# Patient Record
Sex: FEMALE | Race: WHITE | NOT HISPANIC OR LATINO | Employment: STUDENT | URBAN - METROPOLITAN AREA
[De-identification: names, ages, dates, MRNs, and addresses within clinical notes are randomized per-mention and may not be internally consistent; named-entity substitution may affect disease eponyms.]

---
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
## 2024-02-15 ENCOUNTER — HOSPITAL ENCOUNTER (EMERGENCY)
Facility: HOSPITAL | Age: 19
Discharge: HOME | End: 2024-02-15
Attending: EMERGENCY MEDICINE
Payer: COMMERCIAL

## 2024-02-15 ENCOUNTER — APPOINTMENT (OUTPATIENT)
Dept: GENERAL RADIOLOGY | Facility: HOSPITAL | Age: 19
End: 2024-02-15
Payer: COMMERCIAL

## 2024-02-15 VITALS
DIASTOLIC BLOOD PRESSURE: 75 MMHG | RESPIRATION RATE: 17 BRPM | SYSTOLIC BLOOD PRESSURE: 99 MMHG | HEART RATE: 105 BPM | TEMPERATURE: 99 F | OXYGEN SATURATION: 100 % | WEIGHT: 108.03 LBS

## 2024-02-15 DIAGNOSIS — S16.1XXA STRAIN OF NECK MUSCLE, INITIAL ENCOUNTER: ICD-10-CM
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
## 2024-02-15 PROCEDURE — 72050 X-RAY EXAM NECK SPINE 4/5VWS: CPT

## 2024-02-15 PROCEDURE — 99283 EMERGENCY DEPT VISIT LOW MDM: CPT

## 2024-02-15 RX ORDER — IBUPROFEN 600 MG/1
600 TABLET ORAL EVERY 6 HOURS PRN
Qty: 30 TABLET | Refills: 0 | Status: SHIPPED | OUTPATIENT
Start: 2024-02-15

## 2024-02-15 RX ORDER — CYCLOBENZAPRINE HCL 10 MG
10 TABLET ORAL ONCE
Status: COMPLETED | OUTPATIENT
Start: 2024-02-15 | End: 2024-02-15

## 2024-02-15 RX ORDER — CYCLOBENZAPRINE HCL 10 MG
10 TABLET ORAL 3 TIMES DAILY PRN
Qty: 15 TABLET | Refills: 0 | Status: SHIPPED | OUTPATIENT
Start: 2024-02-15

## 2024-02-15 RX ADMIN — CYCLOBENZAPRINE 10 MG: 10 TABLET, FILM COATED ORAL at 19:31

## 2024-02-15 RX ADMIN — IBUPROFEN 600 MG: 400 TABLET ORAL at 19:31

## 2024-02-15 NOTE — Clinical Note
[facility] EMERGENCY ROOM  [address]  [place] [address]  Phone: [phone]    Gillian Boogie was seen and treated in our emergency department on 2/15/2024.  She may return to school on 02/19/2024.          Thank you for choosing [facility].    Althea Dos Santos APRN
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
## 2024-02-15 NOTE — Clinical Note
[facility] EMERGENCY ROOM  [address]  [place] [address]  Phone: [phone]    Gillian Boogie was seen and treated in our emergency department on 2/15/2024.  She may return to work on 02/17/2024.         Thank you for choosing [facility].    Althea Dos Santos APRN

## 2024-02-16 NOTE — DISCHARGE INSTRUCTIONS
Rest.  Alternate ice and moist heat for comfort.    Medications as prescribed for symptomatic treatment.    Follow-up with PCP if no better.    Return for new or worsening symptoms

## 2024-07-26 ENCOUNTER — TELEPHONE (OUTPATIENT)
Dept: OBSTETRICS AND GYNECOLOGY | Facility: CLINIC | Age: 19
End: 2024-07-26
Payer: COMMERCIAL

## 2024-09-13 ENCOUNTER — OFFICE VISIT (OUTPATIENT)
Dept: FAMILY MEDICINE CLINIC | Facility: CLINIC | Age: 19
End: 2024-09-13
Payer: COMMERCIAL

## 2024-09-13 VITALS
OXYGEN SATURATION: 99 % | SYSTOLIC BLOOD PRESSURE: 102 MMHG | HEART RATE: 71 BPM | DIASTOLIC BLOOD PRESSURE: 64 MMHG | HEIGHT: 61 IN | WEIGHT: 104.4 LBS | BODY MASS INDEX: 19.71 KG/M2

## 2024-09-13 DIAGNOSIS — Z30.41 ENCOUNTER FOR SURVEILLANCE OF CONTRACEPTIVE PILLS: Primary | ICD-10-CM

## 2024-09-13 PROCEDURE — 99203 OFFICE O/P NEW LOW 30 MIN: CPT | Performed by: NURSE PRACTITIONER

## 2024-09-13 RX ORDER — NORGESTIMATE AND ETHINYL ESTRADIOL 0.25-0.035
1 KIT ORAL DAILY
COMMUNITY
End: 2024-09-13 | Stop reason: SDUPTHER

## 2024-09-13 RX ORDER — NORGESTIMATE AND ETHINYL ESTRADIOL 0.25-0.035
1 KIT ORAL DAILY
Qty: 84 TABLET | Refills: 3 | Status: SHIPPED | OUTPATIENT
Start: 2024-09-13

## 2024-09-13 RX ORDER — ALBUTEROL SULFATE 0.83 MG/ML
SOLUTION RESPIRATORY (INHALATION) EVERY 6 HOURS SCHEDULED
COMMUNITY
Start: 2023-12-20

## 2024-09-13 NOTE — PROGRESS NOTES
"Chief Complaint  Annual Exam and Med Refill (Pt needs a refill on birth control )    Subjective          Gillian Boogie is a 18 y.o. female who presents to [facility]    History of Present Illness    Here to establish:    Birth control: Periods regular, not heavy and no cramps.  Dating same [place].    PHQ-2 Total Score: 0   PHQ-9 Total Score: 0     Medical History: has a past medical history of Allergic.     Surgical History: has a past surgical history that includes [place] tooth extraction.     Family History: family history includes Anesthesia problems in her mother; Arthritis in her mother; Cancer in her father, maternal aunt, maternal grandfather, and maternal grandmother; Heart disease in her maternal grandfather; Hyperlipidemia in her maternal grandfather, maternal grandmother, maternal uncle, and mother; Miscarriages / Stillbirths in her mother.     Social History: reports that she has never smoked. She has never used smokeless tobacco. She reports that she does not drink alcohol and does not use drugs.    Allergies: Patient has no known allergies.      Health Maintenance Due   Topic Date Due    HPV VACCINES (1 - 3-dose series) Never done    HEPATITIS C SCREENING  Never done    ANNUAL PHYSICAL  Never done    COVID-19 Vaccine (3 - 2023-24 season) 09/01/2024    INFLUENZA VACCINE  08/01/2024            Current Outpatient Medications:     albuterol (PROVENTIL) (2.5 MG/3ML) 0.083% nebulizer solution, Every 6 (Six) Hours., Disp: , Rfl:     norgestimate-ethinyl estradiol (Estarylla) 0.25-35 MG-MCG per tablet, Take 1 tablet by mouth Daily., Disp: 84 tablet, Rfl: 3      Immunization History   Administered Date(s) Administered    COVID-19 (PFIZER) Purple Cap Monovalent 08/22/2021, 09/12/2021         Objective       Vitals:    09/13/24 0951   BP: 102/64   Pulse: 71   SpO2: 99%   Weight: 47.4 kg (104 lb 6.4 oz)   Height: 154.9 cm (61\")      Body mass index is 19.73 kg/m².   Wt Readings " from Last 3 Encounters:   09/13/24 47.4 kg (104 lb 6.4 oz) (7%, Z= -1.45)*   02/15/24 49 kg (108 lb 0.4 oz) (13%, Z= -1.13)*   08/16/22 47.2 kg (104 lb) (10%, Z= -1.26)*     * Growth percentiles are based on [facility] (Girls, 2-20 Years) data.      BP Readings from Last 3 Encounters:   09/13/24 102/64   02/15/24 99/75   08/30/21 107/62        Pediatric BMI = 24 %ile (Z= -0.70) based on CDC (Girls, 2-20 Years) BMI-for-age based on BMI available as of 9/13/2024.. BMI is within normal parameters. No other follow-up for BMI required.       Physical Exam  Vitals reviewed.   Constitutional:       Appearance: Normal appearance.   Cardiovascular:      Rate and Rhythm: Normal rate and regular rhythm.      Pulses: Normal pulses.      Heart sounds: Normal heart sounds.   Pulmonary:      Effort: Pulmonary effort is normal.      Breath sounds: Normal breath sounds.   Skin:     General: Skin is warm and dry.   Neurological:      Mental Status: She is alert and oriented to person, place, and time.   Psychiatric:         Mood and Affect: Mood normal.         Behavior: Behavior normal.         Thought Content: Thought content normal.         Judgment: Judgment normal.             Result Review :                          Assessment and Plan        Diagnoses and all orders for this visit:    1. Encounter for surveillance of contraceptive pills (Primary)  Comments:  Doing well on Estarylla.  Orders:  -     norgestimate-ethinyl estradiol (Estarylla) 0.25-35 MG-MCG per tablet; Take 1 tablet by mouth Daily.  Dispense: 84 tablet; Refill: 3          Follow Up     Return if symptoms worsen or fail to improve.    Patient was given instructions and counseling regarding her condition or for health maintenance advice. Please see specific information pulled into the AVS if appropriate. Patient understands the importance of having any ordered tests to be performed in a timely fashion.      I spent 10 minutes caring for Gillian on this date of service.  This time includes time spent by me in the following activities: preparing for the visit, reviewing tests, performing a medically appropriate examination and/or evaluation, counseling and educating the patient/family/caregiver, referring and communicating with other health care professionals, documenting information in the medical record, independently interpreting results and communicating that information with the patient/family/caregiver, care coordination, and ordering medications      SUSAN Gallegos

## 2024-09-20 ENCOUNTER — PATIENT ROUNDING (BHMG ONLY) (OUTPATIENT)
Dept: FAMILY MEDICINE CLINIC | Facility: CLINIC | Age: 19
End: 2024-09-20
Payer: COMMERCIAL

## 2024-10-31 ENCOUNTER — OFFICE VISIT (OUTPATIENT)
Dept: FAMILY MEDICINE CLINIC | Facility: CLINIC | Age: 19
End: 2024-10-31
Payer: COMMERCIAL

## 2024-10-31 VITALS
SYSTOLIC BLOOD PRESSURE: 107 MMHG | WEIGHT: 107.6 LBS | HEIGHT: 61 IN | BODY MASS INDEX: 20.32 KG/M2 | HEART RATE: 82 BPM | TEMPERATURE: 98.2 F | DIASTOLIC BLOOD PRESSURE: 65 MMHG | OXYGEN SATURATION: 100 %

## 2024-10-31 DIAGNOSIS — R06.2 WHEEZING: ICD-10-CM

## 2024-10-31 DIAGNOSIS — R05.9 COUGH, UNSPECIFIED TYPE: Primary | ICD-10-CM

## 2024-10-31 DIAGNOSIS — R50.9 FEVER, UNSPECIFIED FEVER CAUSE: ICD-10-CM

## 2024-10-31 LAB
EXPIRATION DATE: NORMAL
FLUAV AG UPPER RESP QL IA.RAPID: NOT DETECTED
FLUBV AG UPPER RESP QL IA.RAPID: NOT DETECTED
INTERNAL CONTROL: NORMAL
Lab: NORMAL
SARS-COV-2 AG UPPER RESP QL IA.RAPID: NOT DETECTED

## 2024-10-31 RX ORDER — ALBUTEROL SULFATE 90 UG/1
2 INHALANT RESPIRATORY (INHALATION) EVERY 4 HOURS PRN
Qty: 6.7 G | Refills: 2 | Status: SHIPPED | OUTPATIENT
Start: 2024-10-31

## 2024-10-31 RX ORDER — GUAIFENESIN 600 MG/1
1200 TABLET, EXTENDED RELEASE ORAL 2 TIMES DAILY
Qty: 28 TABLET | Refills: 0 | Status: SHIPPED | OUTPATIENT
Start: 2024-10-31

## 2024-10-31 RX ORDER — CETIRIZINE HYDROCHLORIDE 10 MG/1
10 TABLET ORAL DAILY
Qty: 30 TABLET | Refills: 1 | Status: SHIPPED | OUTPATIENT
Start: 2024-10-31

## 2024-10-31 RX ORDER — ALBUTEROL SULFATE 0.83 MG/ML
2.5 SOLUTION RESPIRATORY (INHALATION) EVERY 6 HOURS SCHEDULED
Qty: 60 ML | Refills: 1 | Status: SHIPPED | OUTPATIENT
Start: 2024-10-31

## 2024-10-31 NOTE — PROGRESS NOTES
"Chief Complaint  Cough (Productive cough, Chest congestion, headache,fever Ongoing a week and a half)    Subjective      Gillian Boogie is a 18 y.o. female who presents to [facility]     History of Present Illness  The patient presents for evaluation of cough and wheezing.    She has been experiencing a fever, which she has been managing with Tylenol. Her voice has been hoarse for the past 5 days, showing slight improvement as the day progresses. She denies any ear pain or discharge. She has a history of asthma and uses albuterol as needed. She also takes over-the-counter Allegra for allergies. She has a nebulizer machine at home. She had a fever of 100.1 degrees yesterday but is currently afebrile.       Patient Care Team:  Zuleyma Roa APRN as PCP - General (Nurse Practitioner)      Review of Systems   Constitutional:  Positive for fever.   HENT:  Positive for congestion and rhinorrhea.    Respiratory:  Positive for cough and wheezing.    Musculoskeletal:  Negative for arthralgias and myalgias.         Objective   Vital Signs:   Vitals:    10/31/24 1424   BP: 107/65   BP Location: Left arm   Patient Position: Sitting   Cuff Size: Adult   Pulse: 82   Temp: 98.2 °F (36.8 °C)   TempSrc: Oral   SpO2: 100%   Weight: 48.8 kg (107 lb 9.6 oz)   Height: 154.9 cm (60.98\")     Body mass index is 20.34 kg/m².    Wt Readings from Last 3 Encounters:   10/31/24 48.8 kg (107 lb 9.6 oz) (11%, Z= -1.21)*   09/13/24 47.4 kg (104 lb 6.4 oz) (7%, Z= -1.45)*   02/15/24 49 kg (108 lb 0.4 oz) (13%, Z= -1.13)*     * Growth percentiles are based on CDC (Girls, 2-20 Years) data.     BP Readings from Last 3 Encounters:   10/31/24 107/65   09/13/24 102/64   02/15/24 99/75       Health Maintenance   Topic Date Due    HPV VACCINES (1 - 3-dose series) Never done    HEPATITIS C SCREENING  Never done    ANNUAL PHYSICAL  Never done    COVID-19 Vaccine (3 - 2023-24 season) 09/01/2024    TDAP/TD VACCINES " (2 - Td or Tdap) 11/30/2025    INFLUENZA VACCINE  Completed    MENINGOCOCCAL VACCINE  Completed    Pneumococcal Vaccine 0-64  Aged Out       Physical Exam  HENT:      Head: Normocephalic and atraumatic.      Right Ear: Tympanic membrane normal.      Left Ear: Tympanic membrane normal.      Mouth/Throat:      Mouth: Mucous membranes are moist.      Pharynx: Posterior oropharyngeal erythema present. No oropharyngeal exudate.   Eyes:      Pupils: Pupils are equal, round, and reactive to light.   Cardiovascular:      Rate and Rhythm: Normal rate and regular rhythm.      Pulses: Normal pulses.      Heart sounds: Normal heart sounds.   Pulmonary:      Effort: Pulmonary effort is normal.      Breath sounds: Normal breath sounds. Wheezing present.         Physical Exam         Result Review   The following data was reviewed by: Sammi Hagan MD on 10/31/2024:  [x]  Tests & Results  []  Hospitalization/Emergency Department/Urgent Care  []  Internal/External Consultant Notes      Results  Laboratory Studies  Covid and flu tests came back negative.       ASSESSMENT/PLAN  Diagnoses and all orders for this visit:    1. Cough, unspecified type (Primary)  -     POCT SARS-CoV-2 Antigen NINI + Flu  -     guaiFENesin (Mucinex) 600 MG 12 hr tablet; Take 2 tablets by mouth 2 (Two) Times a Day.  Dispense: 28 tablet; Refill: 0    2. Fever, unspecified fever cause  -     POCT SARS-CoV-2 Antigen NINI + Flu    3. Wheezing  -     albuterol (PROVENTIL) (2.5 MG/3ML) 0.083% nebulizer solution; Take 2.5 mg by nebulization Every 6 (Six) Hours.  Dispense: 60 mL; Refill: 1  -     albuterol sulfate  (90 Base) MCG/ACT inhaler; Inhale 2 puffs Every 4 (Four) Hours As Needed for Wheezing.  Dispense: 6.7 g; Refill: 2  -     cetirizine (zyrTEC) 10 MG tablet; Take 1 tablet by mouth Daily.  Dispense: 30 tablet; Refill: 1      Assessment & Plan  1. Viral upper respiratory infection.  Her COVID-19 and influenza tests returned negative results.  Inspiratory wheezing was noted in the right upper lobe. There is no current suspicion of pneumonia. A prescription for Zyrtec to be taken at night was provided. She was advised to perform warm water gargles with salt three times daily and to maintain adequate hydration. A mild diet was recommended. Over-the-counter ibuprofen or Tylenol was suggested for fever management. Mucinex was prescribed to be taken twice daily for a duration of two weeks. Should she develop a productive cough with purulent, foul-smelling sputum, or experience severe chest pain or discomfort, an evaluation for pneumonia will be conducted.    3. Asthma.  She reported using albuterol as needed for wheezing. A refill for her albuterol inhaler was provided. She was advised to use her nebulizer at home with the albuterol solution. If she runs out of the solution, she should get a refill.         Pediatric BMI = 32 %ile (Z= -0.46) based on CDC (Girls, 2-20 Years) BMI-for-age based on BMI available on 10/31/2024.. BMI is within normal parameters. No other follow-up for BMI required.       Gillian Boogie  reports that she has never smoked. She has never used smokeless tobacco. I       FOLLOW UP  No follow-ups on file.  Patient was given instructions and counseling regarding her condition or for health maintenance advice. Please see specific information pulled into the AVS if appropriate.       Sammi Hagan MD  10/31/24  16:46 EDT    Patient or patient representative verbalized consent for the use of Ambient Listening during the visit with  Sammi Hagan MD for chart documentation. 10/31/2024  16:47 EDT

## 2024-12-10 DIAGNOSIS — Z30.41 ENCOUNTER FOR SURVEILLANCE OF CONTRACEPTIVE PILLS: ICD-10-CM

## 2024-12-10 RX ORDER — NORGESTIMATE AND ETHINYL ESTRADIOL 0.25-0.035
1 KIT ORAL DAILY
Qty: 84 TABLET | Refills: 3 | Status: SHIPPED | OUTPATIENT
Start: 2024-12-10

## 2024-12-10 NOTE — TELEPHONE ENCOUNTER
Caller: Keagan Gilliansole Sanchez    Relationship: Self    Best call back number: [phone]     Requested Prescriptions:   Requested Prescriptions     Pending Prescriptions Disp Refills    norgestimate-ethinyl estradiol (Estarylla) 0.25-35 MG-MCG per tablet 84 tablet 3     Sig: Take 1 tablet by mouth Daily.        [facility] be sent[facility] [id number] - [phone] [facility] [phone]      Last office visit with prescribing clinician: 9/13/2024   Last telemedicine visit with prescribing clinician: Visit date not found   Next office visit with prescribing clinician: 10/1/2025       Does the patient have less than a 3 day supply:  [x] Yes  [] No    Would you like a call back once the refill request has been completed: [] Yes [] No    If the office needs to give you a call back, can they leave a voicemail: [] Yes [] No    Antonina Shea Rep   12/10/24 15:35 EST

## 2025-01-15 ENCOUNTER — TELEPHONE (OUTPATIENT)
Dept: FAMILY MEDICINE CLINIC | Facility: CLINIC | Age: 20
End: 2025-01-15
Payer: COMMERCIAL

## 2025-01-15 NOTE — TELEPHONE ENCOUNTER
Called patient to reschedule appointment with Zuleyma Roa due to provider leaving the practice. Patient acknowledged and requested to call back to reschedule. Patient needs to be scheduled with Dr. Mcmillan or Dr. Danya SHER TO READ/RELAY AND RESCHEDULE APPT

## 2025-07-01 ENCOUNTER — OFFICE VISIT (OUTPATIENT)
Dept: FAMILY MEDICINE CLINIC | Facility: CLINIC | Age: 20
End: 2025-07-01
Payer: COMMERCIAL

## 2025-07-01 VITALS
DIASTOLIC BLOOD PRESSURE: 73 MMHG | SYSTOLIC BLOOD PRESSURE: 108 MMHG | HEART RATE: 63 BPM | HEIGHT: 61 IN | TEMPERATURE: 97.2 F | OXYGEN SATURATION: 99 % | BODY MASS INDEX: 20.58 KG/M2 | WEIGHT: 109 LBS

## 2025-07-01 DIAGNOSIS — Z00.00 ANNUAL PHYSICAL EXAM: Primary | ICD-10-CM

## 2025-07-01 DIAGNOSIS — Z86.19 HISTORY OF MONONUCLEOSIS: ICD-10-CM

## 2025-07-01 DIAGNOSIS — Z30.41 ENCOUNTER FOR SURVEILLANCE OF CONTRACEPTIVE PILLS: ICD-10-CM

## 2025-07-01 DIAGNOSIS — Z13.31 NEGATIVE DEPRESSION SCREENING: ICD-10-CM

## 2025-07-01 PROCEDURE — 99395 PREV VISIT EST AGE 18-39: CPT | Performed by: NURSE PRACTITIONER

## 2025-07-01 RX ORDER — NORGESTIMATE AND ETHINYL ESTRADIOL 0.25-0.035
1 KIT ORAL DAILY
Qty: 84 TABLET | Refills: 3 | Status: SHIPPED | OUTPATIENT
Start: 2025-07-01

## 2025-07-01 NOTE — LETTER
July 1, 2025     Patient: Gillian Boogie   YOB: 2005   Date of Visit: 7/1/2025       To Whom It May Concern:    It is my medical opinion that Gillian Boogie may return to work.       Sincerely,          SUSAN Jacob    CC: No Recipients

## 2025-07-01 NOTE — PROGRESS NOTES
"Gillian Boogie presents to [facility] with complaint of  Establish Care and Mononucleosis    SUBJECTIVE  History of Present Illness    History of Present Illness  The patient is a 19-year-old female who presents to establish care as a follow-up with a new primary care provider.    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 She is leaning towards specializing in labor and delivery or NICU.    She reports no current health issues except for allergies on occasion. She had mononucleosis about a month ago, which she has since recovered from without any complications. She was advised to seek clearance for work. She reports no abdominal pain or fever. No laboratory tests were conducted, only monospot. She did not experience any neck swelling during her illness.    She is currently on birth control medication and has one refill remaining. She is sexually active. She has no history of depression, smoking, alcohol use, or drug use. Her family history includes kidney cancer, squamous cell cancer, and breast cancer in her grandmother, as well as multiple miscarriages, heart disease, and high cholesterol in her mother.        The following portions of the patient's history were reviewed and updated as appropriate: allergies, current medications, past family history, past medical history, past social history, past surgical history and problem list.    OBJECTIVE  Vital Signs:   /73   Pulse 63   Temp 97.2 °F (36.2 °C) (Oral)   Ht 154.9 cm (60.98\")   Wt 49.4 kg (109 lb)   SpO2 99%   BMI 20.61 kg/m²       Physical Exam  Vitals reviewed.   Constitutional:       General: She is not in acute distress.     Appearance: She is not ill-appearing.   HENT:      Head: Normocephalic and atraumatic.      Right Ear: Tympanic membrane and ear canal normal.      Left Ear: Tympanic membrane and ear canal normal.      Mouth/Throat:      Pharynx: Posterior " oropharyngeal erythema present. No pharyngeal swelling, oropharyngeal exudate or uvula swelling.      Tonsils: No tonsillar exudate. 2+ on the right. 2+ on the left.   Cardiovascular:      Rate and Rhythm: Normal rate and regular rhythm.      Pulses: Normal pulses.      Heart sounds: Normal heart sounds.   Pulmonary:      Effort: Pulmonary effort is normal.      Breath sounds: Normal breath sounds.   Abdominal:      Palpations: There is no hepatomegaly, splenomegaly or mass.      Tenderness: There is no abdominal tenderness. There is no guarding or rebound.   Musculoskeletal:      Cervical back: Neck supple.   Skin:     General: Skin is warm and dry.   Neurological:      General: No focal deficit present.      Mental Status: She is alert and oriented to person, place, and time. Mental status is at baseline.   Psychiatric:         Mood and Affect: Mood normal.         Behavior: Behavior normal.         Judgment: Judgment normal.              ASSESSMENT AND PLAN:  Diagnoses and all orders for this visit:    1. Annual physical exam (Primary)    2. Negative depression screening    3. Encounter for surveillance of contraceptive pills  -     norgestimate-ethinyl estradiol (Estarylla) 0.25-35 MG-MCG per tablet; Take 1 tablet by mouth Daily.  Dispense: 84 tablet; Refill: 3    4. History of mononucleosis        Assessment & Plan  1. Establishment of care.  - Prescription refill for birth control medication will be provided.  - Pap smear scheduled in next 6 months as she will be 20- No smoking, alcohol use, drug use, or issues with depression.    2. Mononucleosis.  - Had mononucleosis about a month ago and has since recovered without complications.  - No issues with abdominal pain or fever reported.  - Tonsils are still slightly swollen but expected to return to normal in a few weeks.  -no hepatosplenomegaly  - No further labs needed at this time.  -may return to work, note provided     3. Allergies.  - Reported allergies.  -  No current symptoms or issues related to allergies.  - No medication prescribed for allergies at this visit.  - Monitoring for any future allergic reactions.          19 to 39: Counseling/Anticipatory Guidance Discussed: nutrition, physical activity, healthy weight, injury prevention, misuse of tobacco, alcohol and drugs, sexual behavior and STDs, dental health, mental health, and cervical cancer screenings    Follow Up   Return in about 5 months (around 12/1/2025) for well woman exam. Patient to notify office with any acute concerns or issues.  Patient verbalizes understanding, agrees with plan of care and has no further questions upon discharge.     Patient was given instructions and counseling regarding her condition or for health maintenance advice. Please see specific information pulled into the AVS if appropriate.     Discussed the importance of following up with any needed screening tests/labs/specialist appointments and any requested follow-up recommended by me today. Importance of maintaining follow-up discussed and patient accepts that missed appointments can delay diagnosis and potentially lead to worsening of conditions.    Patient or patient representative verbalized consent for the use of Ambient Listening during the visit with  SUSAN Jacob for chart documentation. 7/1/2025  08:38 EDT

## 2025-07-10 ENCOUNTER — OFFICE VISIT (OUTPATIENT)
Dept: FAMILY MEDICINE CLINIC | Facility: CLINIC | Age: 20
End: 2025-07-10
Payer: COMMERCIAL

## 2025-07-10 VITALS
WEIGHT: 108 LBS | TEMPERATURE: 98.3 F | HEIGHT: 61 IN | BODY MASS INDEX: 20.39 KG/M2 | DIASTOLIC BLOOD PRESSURE: 76 MMHG | OXYGEN SATURATION: 98 % | SYSTOLIC BLOOD PRESSURE: 124 MMHG | HEART RATE: 83 BPM

## 2025-07-10 DIAGNOSIS — J35.8 TONSILLITH: ICD-10-CM

## 2025-07-10 DIAGNOSIS — J03.90 TONSILLITIS: ICD-10-CM

## 2025-07-10 DIAGNOSIS — R51.9 NONINTRACTABLE HEADACHE, UNSPECIFIED CHRONICITY PATTERN, UNSPECIFIED HEADACHE TYPE: Primary | ICD-10-CM

## 2025-07-10 LAB
EXPIRATION DATE: NORMAL
EXPIRATION DATE: NORMAL
FLUAV AG UPPER RESP QL IA.RAPID: NOT DETECTED
FLUBV AG UPPER RESP QL IA.RAPID: NOT DETECTED
INTERNAL CONTROL: NORMAL
INTERNAL CONTROL: NORMAL
Lab: 367
Lab: NORMAL
S PYO AG THROAT QL: NEGATIVE
SARS-COV-2 AG UPPER RESP QL IA.RAPID: NOT DETECTED

## 2025-07-10 PROCEDURE — 87428 SARSCOV & INF VIR A&B AG IA: CPT | Performed by: NURSE PRACTITIONER

## 2025-07-10 PROCEDURE — 99213 OFFICE O/P EST LOW 20 MIN: CPT | Performed by: NURSE PRACTITIONER

## 2025-07-10 PROCEDURE — 87880 STREP A ASSAY W/OPTIC: CPT | Performed by: NURSE PRACTITIONER

## 2025-07-10 RX ORDER — AMOXICILLIN 500 MG/1
500 TABLET, FILM COATED ORAL 2 TIMES DAILY
Qty: 20 TABLET | Refills: 0 | Status: SHIPPED | OUTPATIENT
Start: 2025-07-10 | End: 2025-07-20

## 2025-07-10 NOTE — PROGRESS NOTES
"Gillian Boogie presents to [facility] with complaint of  Sore Throat, Earache, Headache, and Cough (DRY)    SUBJECTIVE  History of Present Illness    History of Present Illness  The patient is a 19-year-old female who presents to discuss throat pain.    She began experiencing throat pain approximately a week ago, which has progressively worsened. She reports no drainage, attributing this to the effectiveness of over-the-counter allergy medications. However, her throat discomfort has escalated, accompanied by fever (last one this AM), difficulty swallowing. She also describes a sensation of blockage in her throat. The severity of her symptoms increased last night, preventing her from eating. She took ibuprofen and went to bed last night. She reports no ear drainage but mentions ear soreness, headaches, and mild neck pain. She has not observed any white patches on her tonsils.     She has a history of recurrent tonsil stones and tonsillitis since childhood. A tonsillectomy was considered in her youth but was not pursued due to insurance issues. Her last episode of strep throat was in 2024. She has previously had mononucleosis one month ago, but did not experience throat pain at that time.      The following portions of the patient's history were reviewed and updated as appropriate: allergies, current medications, past family history, past medical history, past social history, past surgical history and problem list.    OBJECTIVE  Vital Signs:   /76   Pulse 83   Temp 98.3 °F (36.8 °C)   Ht 154.9 cm (60.98\")   Wt 49 kg (108 lb)   SpO2 98%   BMI 20.42 kg/m²       Physical Exam  Vitals reviewed.   Constitutional:       General: She is not in acute distress.     Appearance: She is not ill-appearing.   HENT:      Head: Normocephalic and atraumatic.      Right Ear: Tympanic membrane and ear canal normal.      Left Ear: Tympanic membrane and ear canal normal.      " Mouth/Throat:      Pharynx: Uvula midline. Pharyngeal swelling (mild) and posterior oropharyngeal erythema present. No oropharyngeal exudate, uvula swelling or postnasal drip.      Tonsils: No tonsillar exudate or tonsillar abscesses. 2+ on the right. 3+ on the left.   Cardiovascular:      Rate and Rhythm: Regular rhythm.      Pulses: Normal pulses.      Heart sounds: Normal heart sounds.   Pulmonary:      Effort: Pulmonary effort is normal.   Musculoskeletal:      Cervical back: Neck supple.   Lymphadenopathy:      Cervical: Cervical adenopathy present.      Left cervical: Superficial cervical adenopathy present.   Skin:     General: Skin is warm and dry.   Neurological:      General: No focal deficit present.      Mental Status: She is alert and oriented to person, place, and time. Mental status is at baseline.   Psychiatric:         Mood and Affect: Mood normal.         Behavior: Behavior normal.         Judgment: Judgment normal.          Results Review:  The following data was reviewed by Melany Harris, SUSAN [unfilled] 14:39 EDT.    Office Visit on 07/10/2025   Component Date Value Ref Range Status    Rapid Strep A Screen 07/10/2025 Negative  Negative, VALID, INVALID, Not Performed Final    Internal Control 07/10/2025 Passed  Passed Final    Lot Number 07/10/2025 [id number]   Final    Expiration Date 07/10/2025 04/30/2026   Final    SARS Antigen 07/10/2025 Not Detected  Not Detected, Presumptive Negative Final    Influenza A Antigen NINI 07/10/2025 Not Detected  Not Detected Final    Influenza B Antigen NINI 07/10/2025 Not Detected  Not Detected Final    Internal Control 07/10/2025 Passed  Passed Final    Lot Number 07/10/2025 [id number]   Final    Expiration Date 07/10/2025 01/26/2026   Final         ASSESSMENT AND PLAN:  Diagnoses and all orders for this visit:    1. Nonintractable headache, unspecified chronicity pattern, unspecified headache type (Primary)  -     POCT SARS-CoV-2 Antigen NINI + Flu    2. Tonsillitis  -     POC  Rapid Strep A    3. Tonsillith  -     Ambulatory Referral to ENT (Otolaryngology)    Other orders  -     amoxicillin (AMOXIL) 500 MG tablet; Take 1 tablet by mouth 2 (Two) Times a Day for 10 days.  Dispense: 20 tablet; Refill: 0        Assessment & Plan  1. Tonsillitis.  - Symptoms have persisted for 7 days and are worsening, with a persistent fever.  - Physical exam shows swollen and red tonsils, no white patches or stones observed, lymph node swelling in the neck.  - Discussed the possibility of a false negative strep test; if no improvement after 2 days of antibiotic therapy, further lab tests may be necessary to rule out a rare recurrence of mononucleosis and to check blood counts.  - Prescribed amoxicillin 500 mg, to be taken twice daily for 10 days. Advised to complete the full course of antibiotics, replace toothbrush in 2 days, continue over-the-counter allergy medication, use salt water gargles, Chloraseptic spray, and increase fluid intake. A work note will be provided.    2. Tonsillolithiasis.  - History of recurrent tonsil stones causing tonsillitis.  - Physical exam shows swollen and red tonsils, no stones observed during the visit.  - Discussed the potential benefit of a tonsillectomy due to recurrent tonsil stones.  - Referral to an ENT specialist will be made to evaluate the need for a tonsillectomy.        Follow Up   Return if symptoms worsen or fail to improve. Patient to notify office with any acute concerns or issues.  Patient verbalizes understanding, agrees with plan of care and has no further questions upon discharge.     Patient was given instructions and counseling regarding her condition or for health maintenance advice. Please see specific information pulled into the AVS if appropriate.     Discussed the importance of following up with any needed screening tests/labs/specialist appointments and any requested follow-up recommended by me today. Importance of maintaining follow-up discussed  and patient accepts that missed appointments can delay diagnosis and potentially lead to worsening of conditions.    Patient or patient representative verbalized consent for the use of Ambient Listening during the visit with  SUSAN Jacob for chart documentation. 7/10/2025  15:08 EDT

## 2025-07-14 ENCOUNTER — TELEPHONE (OUTPATIENT)
Dept: FAMILY MEDICINE CLINIC | Facility: CLINIC | Age: 20
End: 2025-07-14
Payer: COMMERCIAL

## 2025-08-21 DIAGNOSIS — Z30.41 ENCOUNTER FOR SURVEILLANCE OF CONTRACEPTIVE PILLS: ICD-10-CM

## 2025-08-22 RX ORDER — NORGESTIMATE AND ETHINYL ESTRADIOL 0.25-0.035
1 KIT ORAL DAILY
Qty: 84 TABLET | Refills: 3 | OUTPATIENT
Start: 2025-08-22